# Patient Record
Sex: FEMALE | Race: WHITE
[De-identification: names, ages, dates, MRNs, and addresses within clinical notes are randomized per-mention and may not be internally consistent; named-entity substitution may affect disease eponyms.]

---

## 2018-12-12 ENCOUNTER — HOSPITAL ENCOUNTER (OUTPATIENT)
Dept: HOSPITAL 74 - JASU-SURG | Age: 48
Discharge: HOME | End: 2018-12-12
Attending: OBSTETRICS & GYNECOLOGY
Payer: COMMERCIAL

## 2018-12-12 VITALS — HEART RATE: 88 BPM | DIASTOLIC BLOOD PRESSURE: 90 MMHG | SYSTOLIC BLOOD PRESSURE: 152 MMHG

## 2018-12-12 VITALS — TEMPERATURE: 97.3 F

## 2018-12-12 VITALS — BODY MASS INDEX: 31 KG/M2

## 2018-12-12 DIAGNOSIS — N80.0: ICD-10-CM

## 2018-12-12 DIAGNOSIS — N83.201: ICD-10-CM

## 2018-12-12 DIAGNOSIS — N93.9: Primary | ICD-10-CM

## 2018-12-12 PROCEDURE — 0UDB7ZX EXTRACTION OF ENDOMETRIUM, VIA NATURAL OR ARTIFICIAL OPENING, DIAGNOSTIC: ICD-10-PCS | Performed by: OBSTETRICS & GYNECOLOGY

## 2018-12-12 PROCEDURE — 0UB04ZZ EXCISION OF RIGHT OVARY, PERCUTANEOUS ENDOSCOPIC APPROACH: ICD-10-PCS | Performed by: OBSTETRICS & GYNECOLOGY

## 2018-12-12 PROCEDURE — 0UJD8ZZ INSPECTION OF UTERUS AND CERVIX, VIA NATURAL OR ARTIFICIAL OPENING ENDOSCOPIC: ICD-10-PCS | Performed by: OBSTETRICS & GYNECOLOGY

## 2018-12-12 NOTE — HP
History & Physical Update





- History


History: No Change





- Physical


Physical: No Change





- Assessment


Assessment: No Change





- Plan


Plan: No Change (Agree with H&P from 12/10, for laparoscopic ovarian cystectomy 

and hysteroscopy D&C)

## 2018-12-12 NOTE — SURG
Surgery First Assist Note


First Assist: Willie Del Valle PA-C


Date of Service: 12/12/18


Diagnosis: 


Right ovarian cyst, abnormal uterine bleeding








Procedure: 


Laproscopic right ovarian cystectomy, hysteroscopy, suction dilation and 

curretage





I was present for the entirety of the operative procedure. For further detail, 

please refer to operative report.








Visit type





- Case Type


Case Type: Scheduled





- Emergency


Emergency Visit: No





- New patient


This patient is new to me today: Yes


Date on this admission: 12/12/18

## 2018-12-12 NOTE — OP
Operative Note





- Note:


Operative Date: 12/12/18


Pre-Operative Diagnosis: Right ovarian cyst, abnormal uterine bleeding


Operation: Laproscopic right ovarian cystectomy, hysteroscopy, suction dilation 

and curretage


Surgeon: Zoila Burgos


Assistant: Willie Del Valle


Anesthesiologist/CRNA: Cesar Cary


Anesthesia: General


Estimated Blood Loss (mls): 20


Fluid Volume Replaced (mls): 1,000


Operative Report Dictated: Yes

## 2018-12-13 NOTE — OP
DATE OF OPERATION:  12/12/2018

 

PREOPERATIVE DIAGNOSIS:  Abnormal uterine bleeding, right ovarian cyst, and pelvic

pain.  

 

POSTOPERATIVE DIAGNOSIS:  Abnormal uterine bleeding, right ovarian cyst, and pelvic

pain.

 

PROCEDURE:  Laparoscopic right ovarian cystectomy and drainage of endometrioma,

hysteroscopy, suction dilation and curettage.  

 

SURGEON:  Zoila Burgos DO

 

ASSISTANT:  Willie Del Valle PA-C

 

ANESTHESIA:  General by JAIME Doyle

 

ESTIMATED BLOOD LOSS:  20 mL 

 

COMPLICATIONS:  None.  

 

COUNTS:  Sponge, needle, and instrument counts correct.

 

SPECIMENS REMOVED:  Endometrial curettings and  right ovarian cyst.

 

DISPOSITION:  Stable to the PACU.

 

BRIEF HISTORY AND PROCEDURE:  Patient is a 48-year-old female who was seen in the

office with complaints of abnormal uterine bleeding as well as pelvic pain.  Upon

ultrasound examination was noted to have an endometrial polyp and an approximately

4-5 cm right ovarian cyst, which appeared to be hemorrhagic in nature.  Patient was

counseled on the options and elected to undergo laparoscopic removal of the ovarian

cyst as well as the suction dilation and curettage and hysteroscopy.  The consent for

the procedure was signed in the office.  On December 12, 2018, the patient was

admitted to [facility] and consents were reconfirmed. The patient was taken

back to the operating room and given general anesthesia and placed in the dorsal

lithotomy position. The Powell catheter was placed under sterile conditions.  She was

prepped and draped in the usual sterile fashion and a hard timeout was performed.  A

5-mm skin incision was created in the umbilicus and the Veress needle was inserted

into the abdomen.   The abdomen was insufflated with CO2 gas.  A 5-mm trocar was

placed and the camera was inserted.  After confirmation of the intraabdominal

placement, two 5-mm lower quadrant ports were placed in the left and right lower

quadrants under direct visualization.  A right ovarian cyst was noted, which was

incised and the cyst wall was dissected away from the ovarian stroma bluntly. The

cyst was ruptured and hemorrhagic fluid was noted to be draining, which was irrigated

and suctioned copiously.  The cyst was then removed from the 5-mm trocar under direct

visualization without difficulty.  Further examination of the pelvis revealed a

peritoneal endometrioma or what appeared to be a cystic structure on the posterior

wall of the uterus.  This was incised and drained.  Again, hemorrhagic fluid was

noted to be draining.  After copious suction and irrigation, placement of Surgicel

inside the ovarian surgical bed, excellent hemostasis was achieved.  All trocars were

removed from the abdomen.  The abdomen as desufflated and the skin was reapproximated

using 4-0 Biosyn and skin glue.  Attention was then turned down below where the

speculum was placed inside the vagina and the cervix was visualized.  The anterior

lip was grasped with tenaculum.  A diagnostic hysteroscope was advanced into the

fundus and a small anterior uterine polyp was appreciated.  Sharp curettage in all 4

walls of the uterus until adequate uterine _____ was completed as well as suction

curettage to remove the specimen.  One final look with the hysteroscope revealed no

evidence of uterine perforation and adequate removal of endometrial tissue.  All

instruments were removed from the vagina.  Sponge and instrument counts were reported

to be correct.  The Powell catheter was removed.  She was awoken from anesthesia and

recovered in stable condition in the PACU after the procedure. 

 

ZOILA BURGOS DO

 

/[id number]

DD: 12/12/2018 23:07

DT: 12/12/2018 23:38

Job #:  [id number]

## 2018-12-14 NOTE — PATH
Surgical Pathology Report



Patient Name:  MAYCOL MURCIA

Accession #:  [id number]

[facility]. Rec. #:  [id number]                                                        

   /Age/Gender:  1970 (Age: 48) / F

Account:  [id number]                                                          

             Location: [facility] SURGICAL

Taken:  2018

Received:  2018

Reported:  2018

Physicians:  Zoila Burgos M.D.

  



Specimen(s) Received

A: LEFT OVARIAN CYST 

B: UTERINE CONTENTS 





Clinical History

Right ovarian cyst







Final Diagnosis

A. OVARIAN CYST, RIGHT, LAPAROSCOPIC OVARIAN CYSTECTOMY:

CONSISTENT WITH ENDOMETRIOTIC CYST.



B. UTERINE CONTENTS, DILATATION AND CURETTAGE:

FRAGMENTS OF ENDOMETRIAL POLYP AND SCANT BENIGN CERVICAL TISSUE.







***Electronically Signed***

Amrita Carter M.D.





Gross Description

A. Received in formalin labeled "right ovarian cyst," is a 4.5 x 3.0 x 1.0 cm

disrupted cystic structure. The outer surface is tan-gray and smooth. The inner

lining is tan-brown and smooth. No excrescences are identified. The cyst wall

measures 0.1 cm in thickness. No normal ovarian parenchyma is identified.

Representative sections are submitted in 4 cassettes.



B. Received in formalin labeled "uterine contents," is a 4.5 x 3.0 x 0.4 cm

aggregate of tan-brown soft tissue fragments. The formalin is filtered and the

specimen is entirely submitted in 4 cassettes.